# Patient Record
Sex: MALE | Race: BLACK OR AFRICAN AMERICAN | NOT HISPANIC OR LATINO | URBAN - METROPOLITAN AREA
[De-identification: names, ages, dates, MRNs, and addresses within clinical notes are randomized per-mention and may not be internally consistent; named-entity substitution may affect disease eponyms.]

---

## 2024-07-29 ENCOUNTER — OFFICE VISIT (OUTPATIENT)
Dept: SLEEP MEDICINE | Facility: CLINIC | Age: 44
End: 2024-07-29
Payer: COMMERCIAL

## 2024-07-29 VITALS
RESPIRATION RATE: 18 BRPM | HEIGHT: 69 IN | WEIGHT: 240 LBS | BODY MASS INDEX: 35.55 KG/M2 | DIASTOLIC BLOOD PRESSURE: 78 MMHG | SYSTOLIC BLOOD PRESSURE: 114 MMHG | HEART RATE: 77 BPM

## 2024-07-29 DIAGNOSIS — G47.33 OSA (OBSTRUCTIVE SLEEP APNEA): Primary | ICD-10-CM

## 2024-07-29 PROCEDURE — 1036F TOBACCO NON-USER: CPT | Performed by: GENERAL PRACTICE

## 2024-07-29 PROCEDURE — 99204 OFFICE O/P NEW MOD 45 MIN: CPT | Performed by: GENERAL PRACTICE

## 2024-07-29 RX ORDER — ALBUTEROL SULFATE 90 UG/1
2 AEROSOL, METERED RESPIRATORY (INHALATION) EVERY 6 HOURS PRN
COMMUNITY

## 2024-07-29 ASSESSMENT — PATIENT HEALTH QUESTIONNAIRE - PHQ9
2. FEELING DOWN, DEPRESSED OR HOPELESS: NOT AT ALL
SUM OF ALL RESPONSES TO PHQ9 QUESTIONS 1 AND 2: 0
1. LITTLE INTEREST OR PLEASURE IN DOING THINGS: NOT AT ALL

## 2024-07-29 ASSESSMENT — ENCOUNTER SYMPTOMS
DEPRESSION: 0
LOSS OF SENSATION IN FEET: 0
OCCASIONAL FEELINGS OF UNSTEADINESS: 0

## 2024-07-29 ASSESSMENT — COLUMBIA-SUICIDE SEVERITY RATING SCALE - C-SSRS
6. HAVE YOU EVER DONE ANYTHING, STARTED TO DO ANYTHING, OR PREPARED TO DO ANYTHING TO END YOUR LIFE?: NO
2. HAVE YOU ACTUALLY HAD ANY THOUGHTS OF KILLING YOURSELF?: NO
1. IN THE PAST MONTH, HAVE YOU WISHED YOU WERE DEAD OR WISHED YOU COULD GO TO SLEEP AND NOT WAKE UP?: NO

## 2024-07-29 NOTE — PATIENT INSTRUCTIONS
[facility] Sleep Medicine   [facility]  [address] [facility] [address]  [phone]       NAME: Earnest Duran   DATE: 7/29/2024     Your Sleep Provider Today: Donald Roth DO  Your Primary Care Physician: No primary care provider on file.   Your Referring Provider: No ref. provider found    DIAGNOSIS:   1. ABHI (obstructive sleep apnea)  Positive Airway Pressure (PAP) Therapy          Thank you for coming to the Sleep Medicine Clinic today! Your sleep medicine provider today was: Donald Roth DO Below is a summary of your treatment plan, other important information, and our contact numbers:      TREATMENT PLAN   Follow up in 3 months or sooner as needed.     Instructions - Common ABHI Recs: - For your sleep apnea, continue to use your PAP every night and use it whenever you are sleeping.   - Avoid alcohol or sedatives several hours prior to sleeping.   - Get additional supplies for your PAP (e.g., mask, hose, filters) every 3 months or as your insurance allows from your [employer] company. Replacement cushions for your PAP mask can be requested monthly if airseals are an issue.  - Remember to clean your mask, tubings, and water chamber regularly as instructed.  - Avoid driving or operating heavy machinery when drowsy. A person driving while sleepy is five (5) times more likely to have an accident. If you feel sleepy, pull over and take a short power nap (sleep for less than 30 minutes). Otherwise, ask somebody to drive you.        IMPORTANT INFORMATION     Call 911 for medical emergencies.  Our offices are generally open from Monday-Friday, 9 am - 5 pm.  If you need to get in touch with me, you may either call me and my team(number is below) or you can use [email or web address].  If a referral for a test, for CPAP, or for another specialist was made, and you have not heard about scheduling this within a week, please call scheduling at [phone]).  If you are unable  to make your appointment for clinic or an overnight study, kindly call the office at least 48 hours in advance to cancel and reschedule.  If you are on CPAP, please bring your device's card or the device to each clinic appointment.   There are no supporting services by either the sleep doctors or their staff on weekends and Holidays, or after 5 PM on weekdays.   If you have been asked to come to a sleep study, make sure you bring toiletries, a comfy pillow, and any nighttime medications that you may regularly take. Also be sure to eat dinner before you arrive. We generally do not provide meals.      PRESCRIPTIONS     We require 7 days advanced notice for prescription refills. If we do not receive the request in this time, we cannot guarantee that your medication will be refilled in time.      IMPORTANT PHONE NUMBERS     Sleep Medicine Clinic Fax: [phone]  Appointments (for Pediatric Sleep Clinic): [phone] - option 1  Appointments (for Adult Sleep Clinic): [phone] - option 2  Appointments (For Sleep Studies): [phone] - option 3  Behavioral Sleep Medicine: [phone]  Sleep Surgery: [phone]  ENT (Otolaryngology): [phone]  Headache Clinic (Neurology): [phone]  Neurology: [phone]  Psychiatry: [phone]  Pulmonary Function Testing (PFT) Center: [phone]  Pulmonary Medicine: [phone]  [place] (DME): [phone]  [employer] (DME): [phone]  [facility] ([facility]): [phone]      OUR ADULT SLEEP MEDICINE TEAM   Please do not hesitate to call the office or sleep nurse with any questions between appointments:    Adult Sleep Nurses (Shila Mcarthur, RN and Addis Peguero RN):  For clinical questions and refilling prescriptions: [phone]  Email sleep diaries and other documents at: [email or web address]    Adult Sleep Medicine Secretaries:  Sofia Dueñas (For Emmanuelle/Flaca/Noemi/John/Ewelina/Juan):   P:  [phone]  F: [phone]  Darlyn Foster (For [place]): P: [phone]  Fax: [phone]  Velma Hasjarrett (For Jurcevic/Blank): P: [phone]  F: [phone]  Rahel Casonleny (For [place]): P: [phone]  F: [phone]  Jo Amanda (For Sandra/Ck/Zakhary): P: [phone]  F: [phone]  Phoebe Cartagena (For [place]/Hung): P: [phone]  F: [phone]     Adult Sleep Medicine Advanced Practice Providers:  Rafal Stubbs ([place], [place])  Carlita Stover ([facility])  Kalee Ferguson CNP ([place], [place], [place])  Carol nAn Franklin CNP ([place], [place], [place])  Virgie Cedillo ([place], [place], [place])  Yariel Hung CNP ([facility])        OUR SLEEP TESTING LOCATIONS     Our team will contact you to schedule your sleep study, however, you can contact us as follow:  Main Phone Line (scheduling only): [phone]), option 3  Adult and Pediatric Locations  [facility] (6 years and older): [facility] - 4th floor ([address] [place] [address]) After hours line: [phone]  [facility] (Main campus: All ages): [facility], 6th floor. After hours line: [phone]   [place] (5 years and older; younger considered on case-by-case basis): [address], [address]. Scheduling  After hours line: [phone]   [place] (6 years and older): [address]; [address]; [address]   [place] (6 years and older): [address] [facility], [address]  After hours line: [phone]   [place] (13 years and older) in [place]: [address], 2nd floor  After hours line: [phone]   [place] (13 year and older): [address]  After hours line: [phone]     Adult Only Locations:   [place] (18 years and older): [address] [facility], 2nd floor   [place] (18 years and older): [address] [facility]; 4th floor  After hours line:  [phone]   [place] (18 years and older) at [place]: [address] [place] [address]  After hours line: [phone]          CONTACTING YOUR SLEEP MEDICINE PROVIDER     Send a message directly to your provider through \"My Chart\", which is the email service through your  Records Account: [email or web address]   Call [phone] and leave a message. One of the administrative assistants will forward the message to your sleep medicine provider through \"My Chart\" and/or email.     Your sleep medicine provider for this visit was: Donald Roth DO        "

## 2024-07-29 NOTE — PROGRESS NOTES
Patient: Earnest Duran    [id number]  : 1980 -- AGE 44 y.o.    Provider: Donald Roth DO     Location [facility]   Service Date: 2024              [facility] Sleep Medicine Clinic  New Visit Note        HISTORY OF PRESENT ILLNESS     The patient's referring provider is:  beatriz Spain.     HISTORY OF PRESENT ILLNESS   Earnest Duran is a 44 y.o. male who presents to a [facility] Sleep Medicine Clinic for a sleep medicine evaluation with concerns of Sleep Study (Sleep study last year was diagnosed with sleep apnea used pap for a little bit but wants to start using pap again).       PAST SLEEP HISTORY    Pmhx includes asthma, HTN, GERD.     Patient states that he was diagnosed with sleep apnea since around 2019 though [facility]. He was tested due to snoring and HTN. He used pap therapy for a few months then stopped. He thinks the pap machine is missing pieces.     CURRENT HISTORY    On today's visit, 2024, the patient reports that he would like to restart pap therapy.     Sleep schedule  on weekdays / work days:  Usual Bedtime: 11:30 pm  Sleep latency: 30min.   Wake time : 8am  Total sleep time average/day: 8 hours/day  Awakenings: 2x per night, nocturia, short.   Naps: no    Sleep schedule  on weekends/non work days :  Usual Bedtime:  12am   Wake time : 8:30am     Sleep aids: no  Stimulants: no    Occupation: teacher, special education, language, 8th grade.      Preferred sleeping position: SLEEP POSITION: prone and sidelying    Sleep-related ROS:    Snoring: y   Witnessed apneas:  n     Gasping/ choking: n    Am Dry mouth:  y           Nasal congestion:     y, no nasal spray.     am headaches: n    Sleep is described as refreshing.     Daytime sleepiness: n  Fatigue or decreased energy: n  Difficulty remembering things in daytime: sometimes  Difficulty staying focused in daytime: : n  Irritable during the day: n    Drowsy driving: n  Hx of  "car accident: n  Near-miss Car accident: n      RLS screen:  RLSSCREEN: - Sensations: Patient does not have unusual sensations in their extremities that cause an urge to move them     Sleep-related behaviors: DENIES    ESS: 4      REVIEW OF SYSTEMS     REVIEW OF SYSTEMS  Review of Systems   All other systems reviewed and are negative.      ALLERGIES AND MEDICATIONS     ALLERGIES  No Known Allergies    MEDICATIONS  Current Outpatient Medications   Medication Sig Dispense Refill    albuterol 90 mcg/actuation inhaler Inhale 2 puffs every 6 hours if needed for wheezing.      fluticasone propion/salmeterol (ADVAIR HFA INHL) Inhale.      METOPROLOL SUCCINATE ORAL Take by mouth. No dosage      OMEPRAZOLE MAGNESIUM ORAL Take by mouth. No dosage       No current facility-administered medications for this visit.       PAST HISTORY     PAST MEDICAL HISTORY  He  has no past medical history on file.      PAST SURGICAL HISTORY:  History reviewed. No pertinent surgical history.    FAMILY HISTORY  No family history on file.  Uncle has hx sleep apnea.       SOCIAL HISTORY  He  reports that he has never smoked. He has never used smokeless tobacco. He reports current alcohol use. He reports current drug use. Drug: Marijuana.     Caffeine consumption: occasional   Alcohol consumption: on weekends.   Marijuana: 2 joints per day recreational.   Other drugs: n      PHYSICAL EXAM     VITAL SIGNS: /78   Pulse 77   Resp 18   Ht 1.753 m (5' 9\")   Wt 109 kg (240 lb)   BMI 35.44 kg/m²      PREVIOUS WEIGHTS:  Wt Readings from Last 3 Encounters:   07/29/24 109 kg (240 lb)       Physical Exam  Constitutional: Alert and oriented, cooperative, no obvious distress.   HENT: normocephalic.   Eyes: PERRLA, nonicteric   Neck: Supple, trachea midline   respiratory: CTA bilaterally, no wheezing/ crackles/ cough  Cardiac: no rub/ gallops  GI:BS in all 4 quadrants, Soft, nontender, no masses  musculoskeletal/ Extremities: No " clubbing  integumentary: no significant rashes observed.   Neurologic: AOx3.   psychiatric: appropriate mood and affect.  Modified Mallampati: 4    RESULTS/DATA         ASSESSMENT/PLAN     Mr. Duran is a 44 y.o. male and  has no past medical history on file. He was referred to the [facility] for evaluation of sleep apnea.     Problem List Items Addressed This Visit    None      Problem List and Orders  Pmhx includes asthma, HTN, GERD, marijuana user.     1- ABHI  HST 7/22/24 --> moderate ABHI using AASM criteria, AHI 4% 14.7, AHI 3% 24.8.     Reviewed and discussed the above sleep study results and management options in details. All questions answered, patient verbalizes understanding.     Hx of sleep apnea, has a pap machine but stopped using it a few months after diagnosis in 2019. He thinks it may be missing some pieces?     No downloads available today, and unsure about the settings.     -if his old pap machine is in working condition, he may restart using it after thorough cleaning until he receives his new machine.   -Start Autopap 5-15cwp    -do not drive or operate heavy machinery if drowsy.  -avoid sleeping on your back.   -avoid sedating substances/ medication, alcohol, illicit drugs and tobacco.      2- Obesity  counseled on eating a healthy diet and exercising as tolerated.    Follow up in 3 months or sooner as needed.

## 2024-07-30 ENCOUNTER — APPOINTMENT (OUTPATIENT)
Dept: SLEEP MEDICINE | Facility: HOSPITAL | Age: 44
End: 2024-07-30
Payer: COMMERCIAL

## 2024-07-30 DIAGNOSIS — G47.30 SLEEP-DISORDERED BREATHING: Primary | ICD-10-CM
